# Patient Record
Sex: FEMALE | Race: WHITE | NOT HISPANIC OR LATINO | URBAN - NONMETROPOLITAN AREA
[De-identification: names, ages, dates, MRNs, and addresses within clinical notes are randomized per-mention and may not be internally consistent; named-entity substitution may affect disease eponyms.]

---

## 2023-05-01 ENCOUNTER — TELEMEDICINE (OUTPATIENT)
Dept: PRIMARY CARE | Facility: CLINIC | Age: 38
End: 2023-05-01
Payer: COMMERCIAL

## 2023-05-01 DIAGNOSIS — J01.00 ACUTE NON-RECURRENT MAXILLARY SINUSITIS: Primary | ICD-10-CM

## 2023-05-01 PROBLEM — M54.9 BACK PAIN: Status: RESOLVED | Noted: 2023-05-01 | Resolved: 2023-05-01

## 2023-05-01 PROBLEM — J20.9 ACUTE BRONCHITIS WITH BRONCHOSPASM: Status: RESOLVED | Noted: 2023-05-01 | Resolved: 2023-05-01

## 2023-05-01 PROBLEM — R53.83 FATIGUE: Status: ACTIVE | Noted: 2023-05-01

## 2023-05-01 PROCEDURE — 99213 OFFICE O/P EST LOW 20 MIN: CPT | Performed by: PHYSICIAN ASSISTANT

## 2023-05-01 RX ORDER — DOXYCYCLINE 100 MG/1
100 CAPSULE ORAL 2 TIMES DAILY
Qty: 20 CAPSULE | Refills: 0 | Status: SHIPPED | OUTPATIENT
Start: 2023-05-01 | End: 2023-05-11

## 2023-05-01 ASSESSMENT — LIFESTYLE VARIABLES: HISTORY_OF_SMOKING: I SMOKED IN THE PAST, BUT QUIT

## 2023-05-01 NOTE — PROGRESS NOTES
Virtual or Telephone Consent    An interactive audio and video telecommunication system which permits real time communications between the patient (at the originating site) and provider (at the distant site) was utilized to provide this telehealth service.   Verbal consent was requested and obtained from Aiyana Queen on this date, 05/01/23 for a telehealth visit.     Subjective   Patient ID: Aiyana Queen is a 37 y.o. female who presents for Sinus Problem.    HPI   Aiyana Queen is a 37 y.o. year old female patient with presenting virtually with concern for   Chief Complaint   Patient presents with    Sinus Problem       1.5 weeks ago had URI symptoms. Initially improving, but then sinus pressure worsened. Increasing sinus headache.   Using netti pot for irrigation and flonase with minimal relief over the past few days     Denies any possibility of pregnancy.   Review of Systems  Review of Systems:   Constitutional: Denies fever  HEENT: Denies ST, earache  CVS: Denies Chest pain  Pulmonary: Denies wheezing, SOB  GI: Denies N/V  : Denies dysuria  Musculoskeletal:  Denies myalgia  Neuro: Denies focal weakness or numbness.  Skin: Denies Rashes.  *Review of Systems is negative unless otherwise mentioned in HPI or ROS above.    Objective   Vitals not available during virtual exam    Physical Exam  Unable to perform physical exam during virtual exam    Assessment/Plan   Problem List Items Addressed This Visit    None  Visit Diagnoses       Acute non-recurrent maxillary sinusitis    -  Primary    Relevant Medications    doxycycline (Vibramycin) 100 mg capsule          Pt has not done well with Augmentin in the past d/t GI upset.           DISCHARGE    Please schedule a follow up visit     Any prescriptions were sent to the pharmacy, please make sure to pick them up and call if there are any issues or questions.     Thank you for trusting me to be a part of your healthcare.    Take care!     Romina  Michael TIM  [facility]  [phone] [phone]     Please feel free to call the office, or return a message if you have any questions or concerns.

## 2023-08-06 ENCOUNTER — TELEMEDICINE (OUTPATIENT)
Dept: PRIMARY CARE | Facility: CLINIC | Age: 38
End: 2023-08-06
Payer: COMMERCIAL

## 2023-08-06 DIAGNOSIS — J01.00 ACUTE NON-RECURRENT MAXILLARY SINUSITIS: Primary | ICD-10-CM

## 2023-08-06 PROCEDURE — 99212 OFFICE O/P EST SF 10 MIN: CPT | Performed by: NURSE PRACTITIONER

## 2023-08-06 RX ORDER — AMOXICILLIN AND CLAVULANATE POTASSIUM 875; 125 MG/1; MG/1
1 TABLET, FILM COATED ORAL 2 TIMES DAILY
Qty: 20 TABLET | Refills: 0 | Status: SHIPPED | OUTPATIENT
Start: 2023-08-06 | End: 2023-08-16

## 2023-08-06 ASSESSMENT — LIFESTYLE VARIABLES: HISTORY_OF_SMOKING: I SMOKED IN THE PAST, BUT QUIT

## 2023-08-06 NOTE — PATIENT INSTRUCTIONS
Thank you for seeing me today.  It was a pleasure to meet you!    #SINUS PRESSURE/PAIN  Please take Covid test   If negative, may begin Augment x 10 days  Encouraged fluids  Rest     F/U WITH PCP AS NEEDED

## 2023-08-06 NOTE — PROGRESS NOTES
"Aiyana Queen is a 37 y.o. female who presents virtually  today for a sick visit    Chief Complaint   Patient presents with    SINUS CONGESTION       Symptoms: SINUS CONGESTION/PRESSURE IN FACE AND BEHIND EYES  Pt did not Covid test herself, states her son was sick last week, he \"coughed in her face and then she developed a cold, that lasted 1 week, and seemed to be improving until yesterday, her congestion got worse.     Symptom onset has been acute for a time period of 1 day(s).     Severity is described as mild.     Course of her symptoms over time is acute.    Has taken:  Mucinex     Review of Systems  All 13 systems were reviewed and are within normal limits except positive and pertinent negative responses which are noted below or in HPI.      Objective   Vitals:  There were no vitals taken for this visit.        Physical Exam  Pulmonary:      Effort: Pulmonary effort is normal.   Neurological:      Mental Status: She is alert.   Psychiatric:         Mood and Affect: Mood normal.         Thought Content: Thought content normal.         Assessment/Plan   Problem List Items Addressed This Visit    None  Visit Diagnoses       Acute non-recurrent maxillary sinusitis    -  Primary    Relevant Medications    amoxicillin-pot clavulanate (Augmentin) 875-125 mg tablet            "

## 2023-10-05 DIAGNOSIS — F41.9 ANXIETY DISORDER, UNSPECIFIED TYPE: Primary | ICD-10-CM

## 2023-10-05 RX ORDER — HYDROXYZINE HYDROCHLORIDE 10 MG/1
TABLET, FILM COATED ORAL
COMMUNITY
Start: 2021-11-05 | End: 2023-10-05 | Stop reason: SDUPTHER

## 2023-10-07 DIAGNOSIS — F41.9 ANXIETY: Primary | ICD-10-CM

## 2023-10-08 RX ORDER — HYDROXYZINE HYDROCHLORIDE 10 MG/1
TABLET, FILM COATED ORAL
Qty: 30 TABLET | Refills: 11 | Status: SHIPPED | OUTPATIENT
Start: 2023-10-08

## 2023-10-09 RX ORDER — ALPRAZOLAM 0.25 MG/1
TABLET ORAL
Qty: 20 TABLET | Refills: 0 | Status: SHIPPED | OUTPATIENT
Start: 2023-10-09 | End: 2024-05-20

## 2023-10-24 ENCOUNTER — APPOINTMENT (OUTPATIENT)
Dept: PRIMARY CARE | Facility: CLINIC | Age: 38
End: 2023-10-24
Payer: COMMERCIAL

## 2023-10-24 ENCOUNTER — TELEMEDICINE (OUTPATIENT)
Dept: PRIMARY CARE | Facility: CLINIC | Age: 38
End: 2023-10-24
Payer: COMMERCIAL

## 2023-10-24 DIAGNOSIS — J32.0 CHRONIC MAXILLARY SINUSITIS: Primary | ICD-10-CM

## 2023-10-24 PROCEDURE — 99212 OFFICE O/P EST SF 10 MIN: CPT | Performed by: NURSE PRACTITIONER

## 2023-10-24 RX ORDER — AZITHROMYCIN 250 MG/1
TABLET, FILM COATED ORAL
Qty: 6 TABLET | Refills: 0 | Status: SHIPPED | OUTPATIENT
Start: 2023-10-24 | End: 2023-10-29

## 2023-10-24 ASSESSMENT — ENCOUNTER SYMPTOMS
SINUS PRESSURE: 1
COUGH: 1

## 2023-10-24 ASSESSMENT — LIFESTYLE VARIABLES
HISTORY_OF_SMOKING: I SMOKED IN THE PAST, BUT QUIT
HISTORY_OF_SMOKING: I SMOKED IN THE PAST, BUT QUIT

## 2023-10-24 NOTE — PROGRESS NOTES
Subjective   Patient ID: Aiyana Queen is a 38 y.o. female who presents for a Virtual Visit Sinusitis.    Sinusitis  This is a chronic problem. The current episode started 1 to 4 weeks ago. The problem has been gradually worsening since onset. The pain is mild. Associated symptoms include congestion, coughing, sinus pressure and sneezing. (Ears feel full, productive cough this morning.  Started with cold symptoms and progressed to Sinus infection) Past treatments include oral decongestants and saline nose sprays. The treatment provided no relief.   Has tried OTC with little relief  Denies allergy  Noted sinus infections May/August of this year    Review of Systems   HENT:  Positive for congestion, sinus pressure and sneezing.    Respiratory:  Positive for cough.        Physical Exam not performed  E-visit questionnaire reviewed and discussed with patient.   All questions answered    Assessment/Plan   Problem List Items Addressed This Visit    None  Visit Diagnoses         Codes    Chronic maxillary sinusitis    -  Primary J32.0    Relevant Medications    azithromycin (Zithromax) 250 mg tablet          Discussed with patient   Verbalized understanding, Teach back utilized  Recommend follow up with PCP in   week

## 2023-10-24 NOTE — PATIENT INSTRUCTIONS
A prescription was sent to your pharmacy. Your pharmacist can answer any questions or concerns you may have or you may call the office.    Common Cold  Usually starts 3-5 days after exposure and lasts about 10 days with day 3-5 the worst.   The cough may linger a little longer  Symptoms include Sneezing, Stuffy or Runny Nose, Sore Throat, Cough from post nasal drip, Watery Eyes, Sometimes fever    Treatment  Tylenol for aches and pains, fever  Children under 3 months should not take Tylenol, under 6 months should not take Ibuprofen or if dehydrated  Warm salt water gargles for throat discomfort  Lots of Fluids such as tea with honey, soup, broth, water, Electrolyte replacement drinks  Rest      Preventing Infection    Wash your hands. Wash your hands thoroughly and often with soap and water for at least 20 seconds. If soap and water aren't available, use an alcohol-based hand  that contains at least 60% alcohol. Teach your children the importance of hand-washing. Avoid touching your eyes, nose or mouth with unwashed hands.    Disinfect your stuff. Clean and disinfect high-touch surfaces, such as doorknobs, light switches, electronics, and kitchen and bathroom countertops daily. This is especially important when someone in your family has a cold. Wash children's toys periodically.    Cover your cough. Sneeze and cough into tissues. Throw away used tissues right away, then wash your hands thoroughly. If you don't have a tissue, sneeze or cough into the bend of your elbow and then wash your hands.    Don't share. Don't share drinking glasses or eating utensils with other family members. Use your own glass or disposable cups when you or someone else is sick. Label the cup or glass with the name of the person using it.    Stay away from people with colds. Avoid close contact with anyone who has a cold. Stay out of crowds, when possible. Avoid touching your eyes, nose and mouth.  Review your  center's  policies. Look for a  setting with good hygiene practices and clear policies about keeping sick children at home.    Take care of yourself. Eating well and getting exercise and enough sleep is good for your overall health.

## 2023-10-25 PROCEDURE — 88175 CYTOPATH C/V AUTO FLUID REDO: CPT

## 2023-10-25 PROCEDURE — 87624 HPV HI-RISK TYP POOLED RSLT: CPT

## 2023-10-27 ENCOUNTER — LAB REQUISITION (OUTPATIENT)
Dept: LAB | Facility: HOSPITAL | Age: 38
End: 2023-10-27
Payer: COMMERCIAL

## 2023-10-27 DIAGNOSIS — Z11.51 ENCOUNTER FOR SCREENING FOR HUMAN PAPILLOMAVIRUS (HPV): ICD-10-CM

## 2023-10-27 DIAGNOSIS — Z01.419 ENCOUNTER FOR GYNECOLOGICAL EXAMINATION (GENERAL) (ROUTINE) WITHOUT ABNORMAL FINDINGS: ICD-10-CM

## 2023-11-02 ENCOUNTER — APPOINTMENT (OUTPATIENT)
Dept: RADIOLOGY | Facility: CLINIC | Age: 38
End: 2023-11-02
Payer: COMMERCIAL

## 2023-11-03 ENCOUNTER — ANCILLARY PROCEDURE (OUTPATIENT)
Dept: RADIOLOGY | Facility: CLINIC | Age: 38
End: 2023-11-03
Payer: COMMERCIAL

## 2023-11-03 VITALS — BODY MASS INDEX: 18.36 KG/M2 | HEIGHT: 67 IN | WEIGHT: 117 LBS

## 2023-11-03 DIAGNOSIS — Z12.31 ENCOUNTER FOR SCREENING MAMMOGRAM FOR MALIGNANT NEOPLASM OF BREAST: ICD-10-CM

## 2023-11-03 PROCEDURE — 77063 BREAST TOMOSYNTHESIS BI: CPT

## 2023-11-08 LAB
CYTOLOGY CMNT CVX/VAG CYTO-IMP: NORMAL
HPV HR GENOTYPES PNL CVX NAA+PROBE: NEGATIVE
HPV HR GENOTYPES PNL CVX NAA+PROBE: NEGATIVE
HPV16 DNA SPEC QL NAA+PROBE: NEGATIVE
HPV18 DNA SPEC QL NAA+PROBE: NEGATIVE
LAB AP HPV GENOTYPE QUESTION: YES
LAB AP HPV HR: NORMAL
LABORATORY COMMENT REPORT: NORMAL
PATH REPORT.TOTAL CANCER: NORMAL

## 2024-02-20 ENCOUNTER — APPOINTMENT (OUTPATIENT)
Dept: PRIMARY CARE | Facility: CLINIC | Age: 39
End: 2024-02-20
Payer: COMMERCIAL

## 2024-02-26 DIAGNOSIS — F41.1 GAD (GENERALIZED ANXIETY DISORDER): Primary | ICD-10-CM

## 2024-02-26 RX ORDER — CITALOPRAM 40 MG/1
40 TABLET, FILM COATED ORAL DAILY
Qty: 90 TABLET | Refills: 3 | Status: SHIPPED | OUTPATIENT
Start: 2024-02-26

## 2024-03-11 ENCOUNTER — TELEMEDICINE (OUTPATIENT)
Dept: PRIMARY CARE | Facility: CLINIC | Age: 39
End: 2024-03-11
Payer: COMMERCIAL

## 2024-03-11 DIAGNOSIS — F41.9 ANXIETY: Primary | ICD-10-CM

## 2024-03-11 PROCEDURE — 99212 OFFICE O/P EST SF 10 MIN: CPT | Performed by: FAMILY MEDICINE

## 2024-03-11 PROCEDURE — 1036F TOBACCO NON-USER: CPT | Performed by: FAMILY MEDICINE

## 2024-03-11 RX ORDER — HYDROXYZINE HYDROCHLORIDE 10 MG/1
10 TABLET, FILM COATED ORAL 3 TIMES DAILY
Qty: 30 TABLET | Refills: 0 | Status: SHIPPED | OUTPATIENT
Start: 2024-03-11 | End: 2024-03-21

## 2024-03-11 NOTE — PROGRESS NOTES
I performed this visit using realtime telehealth tools, including an audio/video OR telephone connection between the patient listed who was located in the [facility] and myself, Jaqueline Wynne (Board certified in the [facility]).  At the start of the visit, I introduced myself as Dr. Valentin and verified the patients name, , and current physical location.    If they were currently outside of the state of [place], the visit was ended and the patient was referred to alternative means for evaluation and treatment.   The patient was made aware of the limitations of the telehealth visit.  They will not be physically examined and all issues may not be appropriate for a telehealth visit.  If necessary, an in person referral will be made.      All allergies were reviewed as well as reconciliation of all medications.      Pt calls to request refill on hydroxyzine  Takes it for anxiety  Has a surgery coming up and has been feeling a bit anxious about it  No other complaints   Otherwise feeling well    ALL OTHER ROS (-)    General appearance:  Vitals available from patient?No    Alert, oriented, pleasant, in no apparent distress Yes  Answers questions appropriatelyYes  Eyes clear?Yes    Throat exam Not Available    Is patient in respiratory distressNo  Is patient coughing during consult:No  Audible wheezing noted? :No    Psychiatric: Affect normalYes    Other relevant physical exam:      Pt location in [place] and consent obtained. Telemedicine appropriate evaluation completed. Appropriate physical exam done.    Anxiety  Refilled hydroxyzine 10mg   Follow up as needed with PCP

## 2024-03-14 NOTE — PATIENT INSTRUCTIONS
Anxiety  Refilled hydroxyzine 10mg   Follow up as needed with PCP    Please send me a TRUE linksweart message if you have any questions or concerns.  FOR NON URGENT questions only.  Allow up to 72 hours for response.    If you have prescription issues or other questions you can email   Debra Escalante  Digital Health Coordinator, at   [email or web address]         Rest and drink plenty of fluids    Tylenol and or motrin as needed for pain and fever (unless you have been told not to take these because of your personal medical history)    Discussed options and precautions (complaint specific and may include)  Viral versus bacterial infection; use of medications; possible side effects; appropriate over-the-counter medications; possible complications and /or when to follow-up.    Follow-up in 1 to 2 days if not improving.  Follow-up immediately if symptoms worsen.    All red flags requiring in person care were discussed.  All patient's questions were answered.    To connect with a new PCP please visit [email or web address] or call [phone]     If experiencing any severe or worsening symptoms including but not limited to lethargy / chest pain / weakness / dizziness / difficulty breathing please call 911 or go to the emergency department for immediate care!    Limitations to telemedicine include inability to do a complete and accurate physical exam.  Any concerns regarding this were conveyed with the patient and in person follow-up recommended if patient nature of illness does not progress as anticipated during this visit.

## 2024-04-09 ENCOUNTER — TELEMEDICINE (OUTPATIENT)
Dept: PRIMARY CARE | Facility: CLINIC | Age: 39
End: 2024-04-09
Payer: COMMERCIAL

## 2024-04-09 ENCOUNTER — APPOINTMENT (OUTPATIENT)
Dept: PRIMARY CARE | Facility: CLINIC | Age: 39
End: 2024-04-09
Payer: COMMERCIAL

## 2024-04-09 DIAGNOSIS — J01.90 ACUTE SINUSITIS, RECURRENCE NOT SPECIFIED, UNSPECIFIED LOCATION: Primary | ICD-10-CM

## 2024-04-09 PROCEDURE — 99213 OFFICE O/P EST LOW 20 MIN: CPT | Performed by: FAMILY MEDICINE

## 2024-04-09 RX ORDER — AMOXICILLIN 875 MG/1
875 TABLET, FILM COATED ORAL 2 TIMES DAILY
Qty: 20 TABLET | Refills: 0 | Status: SHIPPED | OUTPATIENT
Start: 2024-04-09 | End: 2024-04-19

## 2024-04-09 NOTE — PROGRESS NOTES
Subjective   Patient ID: Aiyana Queen is a 38 y.o. female who presents for sinus congestion and pressure    HPI   38 year-old female presents via [facility] virtual care visit complaining 1 week history of persistent sinus congestion and pressure.  Initially had sore throat, cough and sneezing.  Has been using Flonase and an antihistamine.  No eye drainage.  No fevers or chills.  Some cracking in her ears.    NKDA    Review of Systems  ROS as stated in HPI  Objective   There were no vitals taken for this visit.    Physical Exam  Virtual visit so physical exam was done  Assessment/Plan   Problem List Items Addressed This Visit    None  Visit Diagnoses         Codes    Acute sinusitis, recurrence not specified, unspecified location    -  Primary J01.90    Relevant Medications    amoxicillin (Amoxil) 875 mg tablet            Supportive treatment.  Rx amox  Follow-up if symptoms persist or worsen.    This visit was completed via VIRTUAL visit. All issues as above were discussed and addressed but no physical exam or vital signs were performed. If it was felt that the patient should be evaluated in clinic then they were directed there. The patient verbally consented to visit.

## 2024-05-18 DIAGNOSIS — F41.9 ANXIETY: ICD-10-CM

## 2024-05-20 RX ORDER — ALPRAZOLAM 0.25 MG/1
TABLET ORAL
Qty: 20 TABLET | Refills: 0 | Status: SHIPPED | OUTPATIENT
Start: 2024-05-20

## 2024-05-29 ENCOUNTER — TELEMEDICINE (OUTPATIENT)
Dept: PRIMARY CARE | Facility: CLINIC | Age: 39
End: 2024-05-29
Payer: COMMERCIAL

## 2024-05-29 DIAGNOSIS — J01.90 ACUTE NON-RECURRENT SINUSITIS, UNSPECIFIED LOCATION: Primary | ICD-10-CM

## 2024-05-29 PROCEDURE — 99212 OFFICE O/P EST SF 10 MIN: CPT | Performed by: FAMILY MEDICINE

## 2024-05-29 PROCEDURE — 1036F TOBACCO NON-USER: CPT | Performed by: FAMILY MEDICINE

## 2024-05-29 RX ORDER — BUSPIRONE HYDROCHLORIDE 10 MG/1
TABLET ORAL EVERY 12 HOURS
COMMUNITY
End: 2024-06-06

## 2024-05-29 RX ORDER — ONABOTULINUMTOXINA 200 [USP'U]/1
INJECTION, POWDER, LYOPHILIZED, FOR SOLUTION INTRADERMAL; INTRAMUSCULAR
COMMUNITY

## 2024-05-29 RX ORDER — VALACYCLOVIR HYDROCHLORIDE 1 G/1
TABLET, FILM COATED ORAL
COMMUNITY
Start: 2024-03-13

## 2024-05-29 RX ORDER — TRIAZOLAM 0.25 MG/1
TABLET ORAL
COMMUNITY
Start: 2024-03-11

## 2024-05-29 RX ORDER — PANTOPRAZOLE SODIUM 20 MG/1
TABLET, DELAYED RELEASE ORAL
COMMUNITY

## 2024-05-29 RX ORDER — IBUPROFEN 600 MG/1
TABLET ORAL
COMMUNITY

## 2024-05-29 RX ORDER — DOXYCYCLINE 100 MG/1
100 CAPSULE ORAL 2 TIMES DAILY
Qty: 14 CAPSULE | Refills: 0 | Status: SHIPPED | OUTPATIENT
Start: 2024-05-29 | End: 2024-06-05

## 2024-05-29 ASSESSMENT — LIFESTYLE VARIABLES: HISTORY_OF_SMOKING: I SMOKED IN THE PAST, BUT QUIT

## 2024-05-29 NOTE — PATIENT INSTRUCTIONS
Sinusitis with 7d of sinus symptoms worsening or 10 days without improvement  Doxycycline BID x 7d  Take with a full 8oz glass of water and stay upright for 1h after taking--do not want the pill to get stuck in the esophagus (food tube) as it may burn that mucosa  Photosensitivity-increased risk of sunburn or rash if exposed to sun  Wear sunscreen and reapply--cover exposed skin and avoid sun exposure    Please send me a Tandemt message if you have any questions or concerns.  FOR NON URGENT questions only.  Allow up to 72 hours for response.    If you have prescription issues or other questions you can email   Debra Escalante  Digital Health Coordinator, at   [email or web address]     Rest and drink plenty of fluids    Tylenol and or motrin as needed for pain and fever (unless you have been told not to take these because of your personal medical history)    Discussed options and precautions (complaint specific and may include)  Viral versus bacterial infection; use of medications; possible side effects; appropriate over-the-counter medications; possible complications and /or when to follow-up.    Follow-up in 1 to 2 days if not improving.  Follow-up immediately if symptoms worsen.    All red flags requiring in person care were discussed.  All patient's questions were answered.    To connect with a new PCP please visit [email or web address] or call [phone]     If experiencing any severe or worsening symptoms including but not limited to lethargy / chest pain / weakness / dizziness / difficulty breathing please call 911 or go to the emergency department for immediate care!    Limitations to telemedicine include inability to do a complete and accurate physical exam.  Any concerns regarding this were conveyed with the patient and in person follow-up recommended if patient nature of illness does not progress as anticipated during this visit.    CDC updated Respiratory  Infection guidelines:   When you have a respiratory virus, stay home and away from others (including people  you live with who are not sick) Symptoms can include fever, chills, fatigue, cough,  runny nose, and headache (and others).    You can go back to your normal activities when,   for at least 24 hours,   BOTH are true:    1) Your symptoms are getting better overall  2) You have not had a fever (and are not using fever-reducing medication).    When you go back to your normal activities, take added precaution over the next 5 days, such as taking additional steps for  air, hygiene, masks, physical distancing, and/or testing when you will be around other people indoors.    Keep in mind that you may still be able to spread the virus that made you sick, even if you are feeling better. You are likely to be less contagious at this time, depending on factors like how long you were sick or how sick you were.    If you develop a fever or you start to feel worse after you have gone back to normal activities, stay home and away from others again until, for at least 24 hours, both are true: your symptoms are improving overall, and you have not had a fever (and are not using fever-reducing medication). Then take added precaution for the next 5 days.    If you never had symptoms but tested positive for a respiratory virus?, you may be contagious. For the next 5 days: take added precaution, such as taking additional steps for  air, hygiene, masks, physical distancing, and/or testing when you will be around other people indoors. This is especially important to protect people with factors that increase their risk of severe illness from respiratory viruses.  Avoid immunocompromised, elderly, pregnant women, infants etc    Have a low threshold for in person evaluation if your symptoms worsen.

## 2024-05-29 NOTE — PROGRESS NOTES
I performed this visit using realtime telehealth tools, including an audio/video OR telephone connection between the patient listed who was located in the [facility] and myself, Jaqueline Wynne (Board certified in the [facility]).  At the start of the visit, I introduced myself as Dr. Valentin and verified the patients name, , and current physical location.    If they were currently outside of the state of [place], the visit was ended and the patient was referred to alternative means for evaluation and treatment.   The patient was made aware of the limitations of the telehealth visit.  They will not be physically examined and all issues may not be appropriate for a telehealth visit.  If necessary, an in person referral will be made.      DISCLAIMER:   In preparing for this visit and writing this note, I reviewed previous electronic medical records (labs, imaging and medical charts) of the patient available in the physician portal. Significant findings which helped in decision making are recorded in this encounter charting.    All allergies were reviewed with the patient and all medications reconciled with the patient.    Sxs x 7 days  Started off as a cold/allergies  Taking allergy meds and flonase and saline spray  Felt a little better yesterday  Now today chills  Sinus pressure and congestion--  Took amox 500 in March for sinus  +aches  No fever  No V,D  Sl Nausea/not very hungry  +sinus TTP  No LAD in neck  Not a lot of PND    Alert in NAD  Eyes clear  No resp distress of coughing  Sl LAD on right  +sinus TTP frontal or maxillary  Affect wnl    Sinusitis with 7d of sinus symptoms worsening or 10 days without improvement  Doxycycline BID x 7d  Take with a full 8oz glass of water and stay upright for 1h after taking--do not want the pill to get stuck in the esophagus (food tube) as it may burn that mucosa  Photosensitivity-increased risk of sunburn or rash if exposed to sun  Wear sunscreen and reapply--cover  exposed skin and avoid sun exposure      Telemedicine limits the ability to do a complete and accurate physical exam.     At the completion of the visit, possible diagnoses and plan was discussed with the patient as well as recommended treatments, medications prescribed, and when to follow up for in person evaluation. All questions were answered and the patient verbalized understanding.

## 2024-06-06 DIAGNOSIS — F41.9 ANXIETY: Primary | ICD-10-CM

## 2024-06-06 RX ORDER — BUSPIRONE HYDROCHLORIDE 10 MG/1
15 TABLET ORAL 2 TIMES DAILY
Qty: 270 TABLET | Refills: 3 | Status: SHIPPED | OUTPATIENT
Start: 2024-06-06

## 2024-07-26 ENCOUNTER — APPOINTMENT (OUTPATIENT)
Dept: PRIMARY CARE | Facility: CLINIC | Age: 39
End: 2024-07-26
Payer: COMMERCIAL

## 2024-07-26 ENCOUNTER — TELEMEDICINE (OUTPATIENT)
Dept: PRIMARY CARE | Facility: CLINIC | Age: 39
End: 2024-07-26
Payer: COMMERCIAL

## 2024-07-26 DIAGNOSIS — J01.00 ACUTE MAXILLARY SINUSITIS, RECURRENCE NOT SPECIFIED: Primary | ICD-10-CM

## 2024-07-26 PROBLEM — F41.0 PANIC DISORDER: Status: ACTIVE | Noted: 2024-07-26

## 2024-07-26 PROBLEM — R00.2 PALPITATIONS: Status: ACTIVE | Noted: 2024-07-26

## 2024-07-26 PROBLEM — G25.81 RESTLESS LEGS SYNDROME: Status: ACTIVE | Noted: 2024-07-26

## 2024-07-26 PROBLEM — M17.12 OSTEOARTHRITIS OF LEFT KNEE: Status: ACTIVE | Noted: 2024-07-26

## 2024-07-26 PROBLEM — F50.9 EATING DISORDER WITH ONGOING TREATMENT: Status: ACTIVE | Noted: 2024-07-26

## 2024-07-26 PROBLEM — J30.0 VASOMOTOR RHINITIS: Status: ACTIVE | Noted: 2024-07-26

## 2024-07-26 PROBLEM — F41.1 GENERALIZED ANXIETY DISORDER: Status: ACTIVE | Noted: 2024-07-26

## 2024-07-26 PROBLEM — J32.0 CHRONIC MAXILLARY SINUSITIS: Status: ACTIVE | Noted: 2024-07-26

## 2024-07-26 PROBLEM — M79.7 FIBROMYALGIA: Status: ACTIVE | Noted: 2024-07-26

## 2024-07-26 PROBLEM — J32.9 SINUSITIS: Status: ACTIVE | Noted: 2024-07-26

## 2024-07-26 PROCEDURE — 99214 OFFICE O/P EST MOD 30 MIN: CPT | Performed by: FAMILY MEDICINE

## 2024-07-26 RX ORDER — FLUCONAZOLE 150 MG/1
150 TABLET ORAL DAILY
Qty: 3 TABLET | Refills: 0 | Status: SHIPPED | OUTPATIENT
Start: 2024-07-26 | End: 2024-07-29

## 2024-07-26 RX ORDER — CEPHALEXIN 500 MG/1
500 CAPSULE ORAL 3 TIMES DAILY
Qty: 21 CAPSULE | Refills: 0 | Status: SHIPPED | OUTPATIENT
Start: 2024-07-26 | End: 2024-08-02

## 2024-07-26 ASSESSMENT — ENCOUNTER SYMPTOMS
SHORTNESS OF BREATH: 0
SINUS COMPLAINT: 1
COUGH: 1
SORE THROAT: 0
CHILLS: 1
SINUS PRESSURE: 1
HEADACHES: 1

## 2024-07-26 ASSESSMENT — LIFESTYLE VARIABLES
HISTORY_OF_SMOKING: I SMOKED IN THE PAST, BUT QUIT

## 2024-07-26 NOTE — PROGRESS NOTES
Subjective   Patient ID: Aiyana Queen is a 38 y.o. female who presents for Sinus Problem.    Aiyana Queen presents for a scheduled Telemedicine visit for sinus issues. Patient seen via synchronous audio and video platform. Patient is located in her car and I am in my office. Patient consents to being seen via telemedicine platform, understands the limitations of the platform, and understands that she can be seen in office if preferred.     Had cold 3 weeks ago. Better after 10 days. Sx recurred 4 days ago, cough, hacking, sinus pressure. Nasal decongestants did not help. COVID test taken yesterday negative.       Sinus Problem  This is a new problem. The current episode started in the past 7 days. The problem has been gradually worsening since onset. There has been no fever. Associated symptoms include chills, congestion, coughing, headaches and sinus pressure. Pertinent negatives include no ear pain, shortness of breath, sneezing or sore throat. (Using nasal decongestant. Has flonase at home) Past treatments include nasal decongestants.          Current Outpatient Medications:     ALPRAZolam (Xanax) 0.25 mg tablet, TAKE 1 TABLET BY MOUTH TWICE DAILY FOR 10 DAYS AS NEEDED FOR ACUTE ANXIETY AND PANIC ATTACKS, Disp: 20 tablet, Rfl: 0    Botox 200 unit injection, Botox 200 unit injection  EVERY 3 MONTHS FOR MIGRAINE HEADACHES, Disp: , Rfl:     busPIRone (Buspar) 10 mg tablet, TAKE ONE AND ONE-HALF TABLETS TWICE A DAY, Disp: 270 tablet, Rfl: 3    cephalexin (Keflex) 500 mg capsule, Take 1 capsule (500 mg) by mouth 3 times a day for 7 days., Disp: 21 capsule, Rfl: 0    citalopram (CeleXA) 40 mg tablet, TAKE 1 TABLET DAILY, Disp: 90 tablet, Rfl: 3    fluconazole (Diflucan) 150 mg tablet, Take 1 tablet (150 mg) by mouth once daily for 3 days., Disp: 3 tablet, Rfl: 0    hydrOXYzine HCL (Atarax) 10 mg tablet, TAKE 1 TABLET BY MOUTH THREE TIMES DAILY FOR 10 DAYS AS NEEDED FOR ANXIETY (Patient taking differently:  by g-tube route. TAKE 1 TABLET BY MOUTH THREE TIMES DAILY FOR 10 DAYS AS NEEDED FOR ANXIETY), Disp: 30 tablet, Rfl: 11    hydrOXYzine HCL (Atarax) 10 mg tablet, Take 1 tablet (10 mg) by mouth 3 times a day for 10 days., Disp: 30 tablet, Rfl: 0    ibuprofen 600 mg tablet, TAKE 1 TABLET BY MOUTH EVERY 6 TO 8 HOURS AS NEEDED FOR PAIN, Disp: , Rfl:     pantoprazole (ProtoNix) 20 mg EC tablet, Take by mouth., Disp: , Rfl:     triazolam (Halcion) 0.25 mg tablet, , Disp: , Rfl:     valACYclovir (Valtrex) 1 gram tablet, , Disp: , Rfl:     Patient Active Problem List   Diagnosis    Fatigue    Chronic maxillary sinusitis    Eating disorder with ongoing treatment    Fibromyalgia    Osteoarthritis of left knee    Palpitations    Generalized anxiety disorder    Panic disorder    Restless legs syndrome    Sinusitis    Vasomotor rhinitis         Review of Systems   Constitutional:  Positive for chills.   HENT:  Positive for congestion and sinus pressure. Negative for ear pain, sneezing and sore throat.    Respiratory:  Positive for cough. Negative for shortness of breath.    Neurological:  Positive for headaches.       Objective   There were no vitals taken for this visit.    Physical Exam  Constitutional:       Appearance: Normal appearance.   Pulmonary:      Effort: Pulmonary effort is normal.   Neurological:      Mental Status: She is alert.   Psychiatric:         Mood and Affect: Mood normal.         Behavior: Behavior normal.         Assessment/Plan   Problem List Items Addressed This Visit       Sinusitis - Primary     Does not tolerate Augmentin. Cephalexin 500 mg tid for 7 days. Fluconazole 150 mg every couple days while on it to prevent yeast infection. Resume flonase. She uses Alprazolam rarely and understands not to take it if takes Fluconazole. She will cut Citalopram in half if takes fluconazole. Explained drug interactions. To see PCP if not better in 3 to 5 days.          Relevant Medications    cephalexin (Keflex)  500 mg capsule    fluconazole (Diflucan) 150 mg tablet         Assessment, plans, tests, and follow up discussed with patient and patient verbalized understanding. Aiyana was given an opportunity to ask questions and  any concerns were addressed including but not limited to drug interactions, treatment, need to stop nasal decongestants and switch to Flonase, indications to follow up.

## 2024-07-26 NOTE — ASSESSMENT & PLAN NOTE
Does not tolerate Augmentin. Cephalexin 500 mg tid for 7 days. Fluconazole 150 mg every couple days while on it to prevent yeast infection. Resume flonase. She uses Alprazolam rarely and understands not to take it if takes Fluconazole. She will cut Citalopram in half if takes fluconazole. Explained drug interactions. To see PCP if not better in 3 to 5 days.

## 2024-08-05 ENCOUNTER — TELEMEDICINE (OUTPATIENT)
Dept: PRIMARY CARE | Facility: CLINIC | Age: 39
End: 2024-08-05
Payer: COMMERCIAL

## 2024-08-05 DIAGNOSIS — J01.91 ACUTE RECURRENT SINUSITIS, UNSPECIFIED LOCATION: Primary | ICD-10-CM

## 2024-08-05 PROCEDURE — 99213 OFFICE O/P EST LOW 20 MIN: CPT | Performed by: FAMILY MEDICINE

## 2024-08-05 RX ORDER — DOXYCYCLINE 100 MG/1
100 CAPSULE ORAL 2 TIMES DAILY
Qty: 20 CAPSULE | Refills: 0 | Status: SHIPPED | OUTPATIENT
Start: 2024-08-05 | End: 2024-08-15

## 2024-08-05 ASSESSMENT — LIFESTYLE VARIABLES: HISTORY_OF_SMOKING: I SMOKED IN THE PAST, BUT QUIT

## 2024-08-05 NOTE — PATIENT INSTRUCTIONS
Sinusitis -- recurrent--referral to ENT submitted  Doxycycline--BID x 10 days--This worked well for her in the past  Take with a full 8oz glass of water and stay upright for 1h after taking--do not want the pill to get stuck in the esophagus (food tube) as it may burn that mucosa  Photosensitivity-increased risk of sunburn or rash if exposed to sun  Wear sunscreen and reapply--cover exposed skin and avoid sun exposure    Please send me a [employer] message if you have any questions or concerns.  FOR NON URGENT questions only.  Allow up to 72 hours for response.    If you have prescription issues or other questions you can email   Debra Escalante  Digital Health Coordinator, at   [email or web address]     Rest and drink plenty of fluids    Tylenol and or motrin as needed for pain and fever (unless you have been told not to take these because of your personal medical history)    Discussed options and precautions (complaint specific and may include)  Viral versus bacterial infection; use of medications; possible side effects; appropriate over-the-counter medications; possible complications and /or when to follow-up.    Follow-up in 1 to 2 days if not improving.  Follow-up immediately if symptoms worsen.    All red flags requiring in person care were discussed.  All patient's questions were answered.    To connect with a new PCP please visit [email or web address] or call [phone]     If experiencing any severe or worsening symptoms including but not limited to lethargy / chest pain / weakness / dizziness / difficulty breathing please call 911 or go to the emergency department for immediate care!    Limitations to telemedicine include inability to do a complete and accurate physical exam.  Any concerns regarding this were conveyed with the patient and in person follow-up recommended if patient nature of illness does not progress as anticipated during this visit.    CDC updated  Respiratory Infection guidelines:   When you have a respiratory virus, stay home and away from others (including people  you live with who are not sick) Symptoms can include fever, chills, fatigue, cough,  runny nose, and headache (and others).    You can go back to your normal activities when,   for at least 24 hours,   BOTH are true:    1) Your symptoms are getting better overall  2) You have not had a fever (and are not using fever-reducing medication).    When you go back to your normal activities, take added precaution over the next 5 days, such as taking additional steps for  air, hygiene, masks, physical distancing, and/or testing when you will be around other people indoors.    Keep in mind that you may still be able to spread the virus that made you sick, even if you are feeling better. You are likely to be less contagious at this time, depending on factors like how long you were sick or how sick you were.    If you develop a fever or you start to feel worse after you have gone back to normal activities, stay home and away from others again until, for at least 24 hours, both are true: your symptoms are improving overall, and you have not had a fever (and are not using fever-reducing medication). Then take added precaution for the next 5 days.    If you never had symptoms but tested positive for a respiratory virus?, you may be contagious. For the next 5 days: take added precaution, such as taking additional steps for  air, hygiene, masks, physical distancing, and/or testing when you will be around other people indoors. This is especially important to protect people with factors that increase their risk of severe illness from respiratory viruses.  Avoid immunocompromised, elderly, pregnant women, infants etc    Have a low threshold for in person evaluation if your symptoms worsen.      Over-the-counter cold and cough medications    Take Mucinex for cough, drink plenty of fluids with this  medication and will help break up congestion making it easier to cough up    For cough can use honey (children ages 1 and up) in hot tea or hot water. I recommend putting this in an insulated cup and carrying it around throughout the day to sip on.  Have it at your bedside at night in case you wake up coughing.  You can also use honey cough drops (adults and older children).    Recommend nasal saline for use in children and adults.  Neti Roper can also be helpful.  (Never used tap water and a Neti pot.  Use distilled water.)    If you have plugged up congested ears or the feeling of fluid in your ears, you can use an over-the-counter nasal steroid spray like fluticasone (brand name Flonase) use 2 sprays into each nostril once or twice a day for 7 days.  This will help open up the eustachian tubes and allow the fluid to drain out of your ears.    Sleeping with your head/chest elevated can help with sinus drainage.    Adults only-can use nasal decongestant (like Afrin) at bedtime to open nasal passages so you can breathe through your nose while you sleep; avoid using for longer than 3 days as this medication can become addicting.  Do not use if you have high blood pressure or high heart rate.    For severe pain or fever in adult-Tylenol (2 extra strength) or ibuprofen (3-4 tabs equals 600 to 800 mg) alternating as needed for pain.  Tylenol doses should be 6 to 8 hours apart and ibuprofen doses should be 6 to 8 hours apart.      Common cold medications for adults explained:    **Cold medications for children are not recommended-only Tylenol, Motrin, honey (only for children older than 1 year), cool-mist humidifier, and nasal saline spray are recommended for children.    Mucinex-(generic name guaifenesin)-is an expectorant.  This will thin out all the thick mucus.  Must drink plenty of liquids for this medicine to work.    Dextromethorphan (brand name Delsym or DM)-this medicine is a cough suppressant--caution/avoid use  if taking SSRI medication because of risk of Serotonin Syndrome    Honey in hot water or tea-this is a natural cough suppressant    Decongestant nasal spray- (eg: Afrin) use for temporary relief of nasal congestion-best when used at bedtime to open up nasal passages so that you are not forced to mouth breathe overnight.    Sudafed (generic name pseudoephedrine-this must be bought from the pharmacist) DO NOT use this medicine if you have high blood pressure as it can raise your blood pressure higher.  Do not use if you have any irregular heart rate.  This medicine can help clear congestion in your sinuses.

## 2024-08-09 DIAGNOSIS — K21.9 GASTROESOPHAGEAL REFLUX DISEASE WITHOUT ESOPHAGITIS: Primary | ICD-10-CM

## 2024-08-09 RX ORDER — PANTOPRAZOLE SODIUM 20 MG/1
20 TABLET, DELAYED RELEASE ORAL DAILY
Qty: 90 TABLET | Refills: 3 | Status: SHIPPED | OUTPATIENT
Start: 2024-08-09

## 2024-11-13 ENCOUNTER — HOSPITAL ENCOUNTER (OUTPATIENT)
Dept: RADIOLOGY | Facility: CLINIC | Age: 39
Discharge: HOME | End: 2024-11-13
Payer: COMMERCIAL

## 2024-11-13 VITALS — HEIGHT: 67 IN | WEIGHT: 120 LBS | BODY MASS INDEX: 18.83 KG/M2

## 2024-11-13 DIAGNOSIS — Z12.31 ENCOUNTER FOR SCREENING MAMMOGRAM FOR MALIGNANT NEOPLASM OF BREAST: ICD-10-CM

## 2024-11-13 PROCEDURE — 77067 SCR MAMMO BI INCL CAD: CPT

## 2024-11-13 PROCEDURE — 77063 BREAST TOMOSYNTHESIS BI: CPT | Performed by: RADIOLOGY

## 2024-11-13 PROCEDURE — 77067 SCR MAMMO BI INCL CAD: CPT | Performed by: RADIOLOGY

## 2024-11-18 ENCOUNTER — APPOINTMENT (OUTPATIENT)
Dept: OTOLARYNGOLOGY | Facility: CLINIC | Age: 39
End: 2024-11-18
Payer: COMMERCIAL

## 2025-01-08 DIAGNOSIS — F41.9 ANXIETY DISORDER, UNSPECIFIED TYPE: ICD-10-CM

## 2025-01-08 DIAGNOSIS — F41.9 ANXIETY: ICD-10-CM

## 2025-01-09 RX ORDER — HYDROXYZINE HYDROCHLORIDE 10 MG/1
10 TABLET, FILM COATED ORAL EVERY 4 HOURS PRN
Qty: 20 TABLET | Refills: 0 | Status: SHIPPED | OUTPATIENT
Start: 2025-01-09 | End: 2025-01-10 | Stop reason: SDUPTHER

## 2025-01-09 RX ORDER — ALPRAZOLAM 0.25 MG/1
0.25 TABLET ORAL 3 TIMES DAILY PRN
Qty: 20 TABLET | Refills: 0 | Status: SHIPPED | OUTPATIENT
Start: 2025-01-09 | End: 2025-01-19

## 2025-01-10 ENCOUNTER — TELEPHONE (OUTPATIENT)
Dept: PRIMARY CARE | Facility: CLINIC | Age: 40
End: 2025-01-10
Payer: COMMERCIAL

## 2025-01-10 DIAGNOSIS — F41.9 ANXIETY DISORDER, UNSPECIFIED TYPE: ICD-10-CM

## 2025-01-10 RX ORDER — HYDROXYZINE HYDROCHLORIDE 10 MG/1
10 TABLET, FILM COATED ORAL EVERY 4 HOURS PRN
Qty: 20 TABLET | Refills: 0 | Status: SHIPPED | OUTPATIENT
Start: 2025-01-10 | End: 2025-01-20

## 2025-01-10 NOTE — TELEPHONE ENCOUNTER
Pharmacy called for clarification on medication:   hydrOXYzine HCL (Atarax) 10 mg tablet     Instructions state:    1 tablet (10 mg) by mouth every 4 hours if needed for anxiety for up to 10 days. TAKE 1 TABLET BY MOUTH THREE TIMES DAILY FOR 10 DAYS AS NEEDED FOR ANXIETY

## 2025-02-18 DIAGNOSIS — F41.1 GAD (GENERALIZED ANXIETY DISORDER): ICD-10-CM

## 2025-02-18 RX ORDER — CITALOPRAM 40 MG/1
40 TABLET, FILM COATED ORAL DAILY
Qty: 90 TABLET | Refills: 3 | Status: SHIPPED | OUTPATIENT
Start: 2025-02-18

## 2025-04-01 ENCOUNTER — OFFICE VISIT (OUTPATIENT)
Dept: URGENT CARE | Age: 40
End: 2025-04-01
Payer: COMMERCIAL

## 2025-04-01 VITALS
OXYGEN SATURATION: 99 % | RESPIRATION RATE: 18 BRPM | WEIGHT: 120 LBS | DIASTOLIC BLOOD PRESSURE: 70 MMHG | HEART RATE: 120 BPM | BODY MASS INDEX: 18.83 KG/M2 | SYSTOLIC BLOOD PRESSURE: 100 MMHG | TEMPERATURE: 98.5 F | HEIGHT: 67 IN

## 2025-04-01 DIAGNOSIS — B34.9 VIRAL INFECTION: Primary | ICD-10-CM

## 2025-04-01 DIAGNOSIS — R11.2 NAUSEA AND VOMITING, UNSPECIFIED VOMITING TYPE: ICD-10-CM

## 2025-04-01 DIAGNOSIS — R68.89 FLU-LIKE SYMPTOMS: ICD-10-CM

## 2025-04-01 DIAGNOSIS — R19.7 ACUTE DIARRHEA: ICD-10-CM

## 2025-04-01 LAB
POC CORONAVIRUS SARS-COV-2 PCR: NEGATIVE
POC HUMAN RHINOVIRUS PCR: NEGATIVE
POC INFLUENZA A VIRUS PCR: NEGATIVE
POC INFLUENZA B VIRUS PCR: NEGATIVE
POC RESPIRATORY SYNCYTIAL VIRUS PCR: NEGATIVE

## 2025-04-01 PROCEDURE — 1036F TOBACCO NON-USER: CPT | Performed by: NURSE PRACTITIONER

## 2025-04-01 PROCEDURE — 3008F BODY MASS INDEX DOCD: CPT | Performed by: NURSE PRACTITIONER

## 2025-04-01 PROCEDURE — 87631 RESP VIRUS 3-5 TARGETS: CPT | Performed by: NURSE PRACTITIONER

## 2025-04-01 PROCEDURE — 99203 OFFICE O/P NEW LOW 30 MIN: CPT | Performed by: NURSE PRACTITIONER

## 2025-04-01 RX ORDER — TRIAZOLAM 0.12 MG/1
TABLET ORAL
COMMUNITY
Start: 2025-01-02

## 2025-04-01 RX ORDER — SPIRONOLACTONE 100 MG/1
TABLET, FILM COATED ORAL
COMMUNITY
Start: 2025-02-17

## 2025-04-01 ASSESSMENT — ENCOUNTER SYMPTOMS
DIAPHORESIS: 1
HEADACHES: 1
FATIGUE: 1
CHILLS: 1
DIARRHEA: 1
SINUS PRESSURE: 1
FEVER: 1

## 2025-04-01 NOTE — PROGRESS NOTES
"Subjective   Patient ID: Aiyana Queen is a 39 y.o. female. They present today with a chief complaint of Sinus Problem, Generalized Body Aches, Headache, Fever, and Diarrhea (ALL SX LESS THAN 24 HOURS ).    History of Present Illness  Patient is a 39-year-old female present today with complaints of sinus congestion, generalized bodyaches, headache, fever, intensive night sweats, and several episodes of diarrhea.  All symptoms have started less than 24 hours ago.  Patient denies sore throat or cough.    Past Medical History  Allergies as of 04/01/2025    (No Known Allergies)       (Not in a hospital admission)       Past Medical History:   Diagnosis Date    Acute bronchitis with bronchospasm 05/01/2023    Back pain 05/01/2023    History of bilateral breast implants        History reviewed. No pertinent surgical history.     reports that she has never smoked. She has never used smokeless tobacco.    Review of Systems  Review of Systems   Constitutional:  Positive for chills, diaphoresis, fatigue and fever.   HENT:  Positive for congestion and sinus pressure.    Gastrointestinal:  Positive for diarrhea.   Neurological:  Positive for headaches.                                  Objective    Vitals:    04/01/25 1820   BP: 100/70   Pulse: (!) 120   Resp: 18   Temp: 36.9 °C (98.5 °F)   TempSrc: Oral   SpO2: 99%   Weight: 54.4 kg (120 lb)   Height: 1.702 m (5' 7\")     No LMP recorded.    Physical Exam  Vitals reviewed.   General: Vitals Noted. No distress. Normocephalic.  Cardiovascular:     Heart sounds: Normal heart sounds, S1 normal and S2 normal. No murmur heard.     No friction rub.   Pulmonary:      Effort: Pulmonary effort is normal.      Breath sounds:  Lungs clear to auscultation bilaterally   HEENT: Left and right TM is within normal limits. No drainage.  EOMI, normal conjunctiva, patent nares, Normal OP No maxillary and frontal sinus tenderness on palpation is present. Pharynx and tonsils are not hyperemic, " and without exudate.   Neck: Supple with no adenopathy.  Lymphadenopathy:   No cervical adenopathy on palpation  Lower Body: No right inguinal adenopathy. No left inguinal adenopathy.   Abdominal:      Palpations: There is no hepatomegaly, splenomegaly or mass. Abdomen is soft, non-tender, and non-distended. No suprapubic tenderness. No CVA tenderness.   Skin:     Comments: no rash   Neurological:      Cranial Nerves: Cranial nerves 2-12 are intact.      Sensory: No sensory deficit.      Motor: Motor function is intact.      Deep Tendon Reflexes: Reflexes are normal and symmetric.       Procedures    Point of Care Test & Imaging Results from this visit  Results for orders placed or performed in visit on 04/01/25   POCT SPOTFIRE R/ST Panel Mini w/COVID ([facility]) manually resulted    Specimen: Swab   Result Value Ref Range    POC Sars-Cov-2 PCR Negative Negative    POC Respiratory Syncytial Virus PCR Negative Negative    POC Influenza A Virus PCR Negative Negative    POC Influenza B Virus PCR Negative Negative    POC Human Rhinovirus PCR Negative Negative      Imaging  No results found.    Cardiology, Vascular, and Other Imaging  No other imaging results found for the past 2 days      Diagnostic study results (if any) were reviewed by RUBY Cordova.    Assessment/Plan   Allergies, medications, history, and pertinent labs/EKGs/Imaging reviewed by RUBY Cordova.     Medical Decision Making  On exam patient is non toxic, in no distress. Testing for [place] Covid in the office is negative. On examination as above, no concerns for bronchitis, PNA given short duration of symptoms and examination. Lungs are clean on auscultation, no wheezing or rales. No concerns for sinus infection given no maxillary or frontal tenderness on palpation.  At this time concerns for most likely viral infection. Will be discharged with instructions to increase fluid intake, use of OTC for symptoms relief. Advised to  follow up with PCP for further management. Advised to return if symptoms worsen and needs to be reevaluated. Reviewed with patient signs and symptoms significant to present to ED. Patient verbalized understanding and agreed with the plan.        Orders and Diagnoses  Diagnoses and all orders for this visit:  Viral infection  -     POCT SPOTFIRE R/ST Panel Mini w/COVID ([facility]) manually resulted  Flu-like symptoms  -     POCT SPOTFIRE R/ST Panel Mini w/COVID ([facility]) manually resulted  Acute diarrhea  -     POCT SPOTFIRE R/ST Panel Mini w/COVID ([facility]) manually resulted  Nausea and vomiting, unspecified vomiting type  -     POCT SPOTFIRE R/ST Panel Mini w/COVID ([facility]) manually resulted      Medical Admin Record      Patient disposition: Home    Electronically signed by RUBY Cordova  8:09 PM

## 2025-05-05 ENCOUNTER — E-VISIT (OUTPATIENT)
Dept: PRIMARY CARE | Facility: CLINIC | Age: 40
End: 2025-05-05
Payer: COMMERCIAL

## 2025-05-05 DIAGNOSIS — J01.90 ACUTE BACTERIAL SINUSITIS: Primary | ICD-10-CM

## 2025-05-05 DIAGNOSIS — B96.89 ACUTE BACTERIAL SINUSITIS: Primary | ICD-10-CM

## 2025-05-05 RX ORDER — AZITHROMYCIN 250 MG/1
TABLET, FILM COATED ORAL
Qty: 6 TABLET | Refills: 0 | Status: SHIPPED | OUTPATIENT
Start: 2025-05-05 | End: 2025-05-10

## 2025-05-05 ASSESSMENT — LIFESTYLE VARIABLES: HISTORY_OF_SMOKING: I SMOKED IN THE PAST, BUT QUIT

## 2025-05-05 NOTE — TELEPHONE ENCOUNTER
Evisit survey reviewed   - sxs: congestion, sinus pain, cough, sinus pressure x 1 week.   Diagnosis: acute bacterial sinusitis  Patient requests no augmentin -- zpack rx sent in  Supportive care advice sent to patient